# Patient Record
Sex: FEMALE | Race: WHITE | Employment: FULL TIME | URBAN - METROPOLITAN AREA
[De-identification: names, ages, dates, MRNs, and addresses within clinical notes are randomized per-mention and may not be internally consistent; named-entity substitution may affect disease eponyms.]

---

## 2019-01-08 ENCOUNTER — APPOINTMENT (OUTPATIENT)
Dept: GENERAL RADIOLOGY | Age: 59
End: 2019-01-08
Attending: FAMILY MEDICINE

## 2019-01-08 ENCOUNTER — HOSPITAL ENCOUNTER (OUTPATIENT)
Age: 59
Discharge: HOME | End: 2019-01-08
Attending: FAMILY MEDICINE

## 2019-01-08 VITALS
TEMPERATURE: 98 F | BODY MASS INDEX: 22.5 KG/M2 | OXYGEN SATURATION: 100 % | RESPIRATION RATE: 18 BRPM | WEIGHT: 140 LBS | HEIGHT: 66 IN | DIASTOLIC BLOOD PRESSURE: 76 MMHG | HEART RATE: 84 BPM | SYSTOLIC BLOOD PRESSURE: 126 MMHG

## 2019-01-08 DIAGNOSIS — J06.9 VIRAL UPPER RESPIRATORY TRACT INFECTION: Primary | ICD-10-CM

## 2019-01-08 PROCEDURE — 71046 X-RAY EXAM CHEST 2 VIEWS: CPT | Performed by: FAMILY MEDICINE

## 2019-01-08 PROCEDURE — 99214 OFFICE O/P EST MOD 30 MIN: CPT

## 2019-01-08 PROCEDURE — 99213 OFFICE O/P EST LOW 20 MIN: CPT

## 2019-01-08 RX ORDER — ALBUTEROL SULFATE 90 UG/1
2 AEROSOL, METERED RESPIRATORY (INHALATION) EVERY 4 HOURS PRN
Qty: 1 INHALER | Refills: 0 | Status: SHIPPED | OUTPATIENT
Start: 2019-01-08 | End: 2019-02-07

## 2019-01-08 RX ORDER — PROMETHAZINE HYDROCHLORIDE AND CODEINE PHOSPHATE 6.25; 1 MG/5ML; MG/5ML
5 SYRUP ORAL EVERY 4 HOURS PRN
Qty: 40 ML | Refills: 0 | Status: SHIPPED | OUTPATIENT
Start: 2019-01-08 | End: 2019-01-28

## 2019-01-08 NOTE — ED INITIAL ASSESSMENT (HPI)
Patient states having chest congestion and productive cough x 5 days. Patient c/o back pain with cough. Patient denies fever, denies chest pain or SOB.

## 2019-01-08 NOTE — ED PROVIDER NOTES
Pt seen in [address]      Stated Complaint: Patient presents with:  Cough/URI      --------------   RN assessment:     Cough, congestion  --------------     CC:  Cough, congestion    HPI:  Makenzie Madison is a 66 yea Sexual activity: Yes        Partners: Male        Comment: 6/2/15: rarely active, menopause     Other Topics      Concerns:        Not on file    Social History Narrative      Not on file      Past Medical, Surgical, Family, Medication and allergy historie symmetric all extremities   Neurologic:   CNII-XII intact, normal strength, sensation and reflexes     throughout     ------------------------------------------------  ED Course:  Labs/Tests reviewed:   Labs Reviewed - No data to display    Xr Chest Pa + L

## 2019-01-21 ENCOUNTER — HOSPITAL ENCOUNTER (OUTPATIENT)
Age: 59
Discharge: HOME | End: 2019-01-21
Attending: FAMILY MEDICINE
Payer: COMMERCIAL

## 2019-01-21 VITALS
DIASTOLIC BLOOD PRESSURE: 79 MMHG | SYSTOLIC BLOOD PRESSURE: 131 MMHG | HEIGHT: 67 IN | TEMPERATURE: 98 F | WEIGHT: 140 LBS | RESPIRATION RATE: 20 BRPM | BODY MASS INDEX: 21.97 KG/M2 | HEART RATE: 98 BPM | OXYGEN SATURATION: 98 %

## 2019-01-21 DIAGNOSIS — J01.00 ACUTE NON-RECURRENT MAXILLARY SINUSITIS: Primary | ICD-10-CM

## 2019-01-21 PROCEDURE — 99214 OFFICE O/P EST MOD 30 MIN: CPT

## 2019-01-21 PROCEDURE — 99213 OFFICE O/P EST LOW 20 MIN: CPT

## 2019-01-21 RX ORDER — AMOXICILLIN AND CLAVULANATE POTASSIUM 875; 125 MG/1; MG/1
1 TABLET, FILM COATED ORAL 2 TIMES DAILY
Qty: 20 TABLET | Refills: 0 | Status: SHIPPED | OUTPATIENT
Start: 2019-01-21 | End: 2019-01-31

## 2019-01-21 NOTE — ED PROVIDER NOTES
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 Constitutional and vital signs reviewed. All other systems reviewed and negative except as noted above.     Physical Exam     ED Triage Vitals [01/21/19 1220]   BP (!) 152/91   Pulse 112   Resp 20   Temp 97.8 °F (36.6 °C) place, and time. Skin: No rash noted. She is not diaphoretic. No erythema. No pallor. Nursing note and vitals reviewed. ED Course   Labs Reviewed - No data to display        MDM   Previous notes and imaging reviewed.  Will defer another chest xra

## 2019-01-21 NOTE — ED INITIAL ASSESSMENT (HPI)
Pt presents to the IC with c/o a productive cough for the last 3 weeks. Pt was seen 1.5 weeks ago without improvement. Phlegm is green in color. +nasal congestion. No chest pain or sob. No wheezing. No fevers or chills. Normal appetite.  No ear /throat pain

## 2020-04-08 ENCOUNTER — HOSPITAL ENCOUNTER (OUTPATIENT)
Age: 60
Discharge: HOME | End: 2020-04-08
Payer: COMMERCIAL

## 2020-04-08 VITALS
HEIGHT: 67 IN | TEMPERATURE: 98 F | BODY MASS INDEX: 20.4 KG/M2 | RESPIRATION RATE: 16 BRPM | WEIGHT: 130 LBS | HEART RATE: 112 BPM | SYSTOLIC BLOOD PRESSURE: 132 MMHG | DIASTOLIC BLOOD PRESSURE: 86 MMHG | OXYGEN SATURATION: 98 %

## 2020-04-08 DIAGNOSIS — R30.0 DYSURIA: Primary | ICD-10-CM

## 2020-04-08 PROCEDURE — 99214 OFFICE O/P EST MOD 30 MIN: CPT

## 2020-04-08 PROCEDURE — 81002 URINALYSIS NONAUTO W/O SCOPE: CPT

## 2020-04-08 PROCEDURE — 87086 URINE CULTURE/COLONY COUNT: CPT | Performed by: NURSE PRACTITIONER

## 2020-04-08 RX ORDER — FLUCONAZOLE 150 MG/1
150 TABLET ORAL ONCE
Qty: 1 TABLET | Refills: 0 | Status: SHIPPED | OUTPATIENT
Start: 2020-04-08 | End: 2020-04-08

## 2020-04-08 RX ORDER — CEPHALEXIN 500 MG/1
500 CAPSULE ORAL 3 TIMES DAILY
Qty: 21 CAPSULE | Refills: 0 | Status: SHIPPED | OUTPATIENT
Start: 2020-04-08 | End: 2020-04-15

## 2020-04-08 RX ORDER — PHENAZOPYRIDINE HYDROCHLORIDE 200 MG/1
200 TABLET, FILM COATED ORAL 3 TIMES DAILY PRN
Qty: 6 TABLET | Refills: 0 | Status: SHIPPED | OUTPATIENT
Start: 2020-04-08 | End: 2020-04-15

## 2020-04-08 NOTE — ED INITIAL ASSESSMENT (HPI)
2 days of urinary frequency. Similar symptoms 2 weeks ago. Took AZO with relief. Denies dysuria or cloudy urine. Mild back pain. Lower abdominal pressure.

## 2020-04-08 NOTE — ED PROVIDER NOTES
Patient presents with:  Urinary Symptoms      HPI:     Lupe Washington is a 64year old female who presents with a chief complaint of dysuria, urgency, and frequency for the past couple days.   She states she has been taking cranberry pills with mild r Lifestyle      Physical activity:        Days per week: Not on file        Minutes per session: Not on file      Stress: Not on file    Relationships      Social connections:        Talks on phone: Not on file        Gets together: Not on file        Atten Sig: Take 1 tablet (200 mg total) by mouth 3 (three) times daily as needed for Pain.           Dispense:  6 tablet          Refill:  0      Labs performed this visit:  Recent Results (from the past 10 hour(s))   [facility] POCT URINALYSIS DIPSTICK    [facility]

## 2020-05-04 ENCOUNTER — HOSPITAL ENCOUNTER (OUTPATIENT)
Age: 60
Discharge: HOME | End: 2020-05-04
Payer: COMMERCIAL

## 2020-05-04 VITALS
BODY MASS INDEX: 20.4 KG/M2 | WEIGHT: 130 LBS | DIASTOLIC BLOOD PRESSURE: 83 MMHG | RESPIRATION RATE: 18 BRPM | SYSTOLIC BLOOD PRESSURE: 139 MMHG | OXYGEN SATURATION: 97 % | TEMPERATURE: 98 F | HEIGHT: 67 IN | HEART RATE: 111 BPM

## 2020-05-04 DIAGNOSIS — R39.15 URINARY URGENCY: Primary | ICD-10-CM

## 2020-05-04 PROCEDURE — 99214 OFFICE O/P EST MOD 30 MIN: CPT

## 2020-05-04 PROCEDURE — 81002 URINALYSIS NONAUTO W/O SCOPE: CPT

## 2020-05-04 PROCEDURE — 80047 BASIC METABLC PNL IONIZED CA: CPT

## 2020-05-04 PROCEDURE — 36415 COLL VENOUS BLD VENIPUNCTURE: CPT

## 2020-05-04 PROCEDURE — 87086 URINE CULTURE/COLONY COUNT: CPT | Performed by: NURSE PRACTITIONER

## 2020-05-04 PROCEDURE — 85025 COMPLETE CBC W/AUTO DIFF WBC: CPT | Performed by: NURSE PRACTITIONER

## 2020-05-04 RX ORDER — NITROFURANTOIN 25; 75 MG/1; MG/1
100 CAPSULE ORAL 2 TIMES DAILY
Qty: 14 CAPSULE | Refills: 0 | Status: SHIPPED | OUTPATIENT
Start: 2020-05-04 | End: 2020-05-11

## 2020-05-04 NOTE — ED PROVIDER NOTES
Patient presents with:  Urinary Symptoms      HPI:     Erica Mckeon is a 64year old female who presents with a chief complaint of urinary urgency for 2 days. The patient states she had a urinary tract infection approximately 1 month ago.   She sta Alcohol/week: 0.0 standard drinks        Comment: rarely       Drug use: No      Sexual activity: Yes        Partners: Male        Comment: 6/2/15: rarely active, menopause     Lifestyle      Physical activity:        Days per week: Not on file Urine Dip      iStat (Chem 8)      Nitrofurantoin Monohyd Macro 100 MG Oral Cap          Sig: Take 1 capsule (100 mg total) by mouth 2 (two) times daily for 7 days.           Dispense:  14 capsule          Refill:  0      Labs performed this visit:  Recent i-STAT are within normal limits. The patient is aware of her testing results. Her abdominal exam is negative. She appears well. She is drinking plenty of fluids.   She states she is concerned because this is her second urinary tract infection and 1 christina

## 2020-05-06 ENCOUNTER — PATIENT MESSAGE (OUTPATIENT)
Dept: OBGYN CLINIC | Facility: CLINIC | Age: 60
End: 2020-05-06

## 2020-05-06 NOTE — TELEPHONE ENCOUNTER
From: Anamika Castellon  To: Senthil France MD  Sent: 5/6/2020 3:49 PM CDT  Subject: Referral Request    When should I see a urologist and can you make a referral .

## 2020-05-14 PROBLEM — R31.29 MICROHEMATURIA: Status: ACTIVE | Noted: 2020-05-14

## 2021-02-03 ENCOUNTER — HOSPITAL ENCOUNTER (OUTPATIENT)
Age: 61
Discharge: HOME | End: 2021-02-03
Attending: EMERGENCY MEDICINE
Payer: COMMERCIAL

## 2021-02-03 VITALS
HEART RATE: 115 BPM | TEMPERATURE: 99 F | RESPIRATION RATE: 18 BRPM | OXYGEN SATURATION: 98 % | SYSTOLIC BLOOD PRESSURE: 127 MMHG | DIASTOLIC BLOOD PRESSURE: 78 MMHG

## 2021-02-03 DIAGNOSIS — R21 SKIN RASH: Primary | ICD-10-CM

## 2021-02-03 LAB — GLUCOSE BLDC GLUCOMTR-MCNC: 113 MG/DL (ref 70–99)

## 2021-02-03 PROCEDURE — 82962 GLUCOSE BLOOD TEST: CPT

## 2021-02-03 PROCEDURE — 99212 OFFICE O/P EST SF 10 MIN: CPT

## 2021-02-03 RX ORDER — CLOTRIMAZOLE 1 %
1 CREAM (GRAM) TOPICAL 2 TIMES DAILY PRN
Qty: 12 G | Refills: 0 | Status: SHIPPED | OUTPATIENT
Start: 2021-02-03

## 2021-02-03 NOTE — ED INITIAL ASSESSMENT (HPI)
PATIENT ARRIVED AMBULATORY TO ROOM C/O A RASH TO BILATERAL UPPER THIGHS X1 MONTH. PATIENT STATES THE RASH IS MUCH BETTER BUT IT IS STILL THERE. NO ITCHINESS.

## 2021-02-03 NOTE — ED PROVIDER NOTES
Patient Seen in: [facility]      History   Patient presents with:  Rash Skin Problem    Stated Complaint: Rash    HPI/Subjective:   HPI    The patient is a 53-year-old female with past history of partial pneumonectomy who presents now with a tenderness  Eyes: Nonicteric sclera, no conjunctival injection  ENT: TMs are clear and flat bilaterally.   There is no posterior pharyngeal erythema  Chest: Clear to auscultation, no tenderness  Cardiovascular: Regular rate and rhythm without murmur  [place]

## 2022-05-06 ENCOUNTER — HOSPITAL ENCOUNTER (OUTPATIENT)
Age: 62
Discharge: HOME | End: 2022-05-06
Attending: EMERGENCY MEDICINE
Payer: COMMERCIAL

## 2022-05-06 VITALS
RESPIRATION RATE: 18 BRPM | SYSTOLIC BLOOD PRESSURE: 151 MMHG | TEMPERATURE: 99 F | OXYGEN SATURATION: 97 % | HEART RATE: 99 BPM | DIASTOLIC BLOOD PRESSURE: 90 MMHG

## 2022-05-06 DIAGNOSIS — J01.10 ACUTE NON-RECURRENT FRONTAL SINUSITIS: Primary | ICD-10-CM

## 2022-05-06 LAB — SARS-COV-2 RNA RESP QL NAA+PROBE: NOT DETECTED

## 2022-05-06 PROCEDURE — 99213 OFFICE O/P EST LOW 20 MIN: CPT

## 2022-05-06 RX ORDER — FLUTICASONE PROPIONATE 50 MCG
2 SPRAY, SUSPENSION (ML) NASAL DAILY
Qty: 16 G | Refills: 0 | Status: SHIPPED | OUTPATIENT
Start: 2022-05-06 | End: 2022-06-05

## 2022-05-06 RX ORDER — AMOXICILLIN 500 MG/1
500 TABLET, FILM COATED ORAL 3 TIMES DAILY
Qty: 21 TABLET | Refills: 0 | Status: SHIPPED | OUTPATIENT
Start: 2022-05-06 | End: 2022-05-13

## 2024-04-03 ENCOUNTER — APPOINTMENT (OUTPATIENT)
Dept: GENERAL RADIOLOGY | Age: 64
End: 2024-04-03
Attending: EMERGENCY MEDICINE
Payer: COMMERCIAL

## 2024-04-03 ENCOUNTER — HOSPITAL ENCOUNTER (OUTPATIENT)
Age: 64
Discharge: HOME | End: 2024-04-03
Payer: COMMERCIAL

## 2024-04-03 VITALS
DIASTOLIC BLOOD PRESSURE: 80 MMHG | RESPIRATION RATE: 16 BRPM | HEART RATE: 88 BPM | SYSTOLIC BLOOD PRESSURE: 133 MMHG | TEMPERATURE: 97 F | OXYGEN SATURATION: 100 %

## 2024-04-03 DIAGNOSIS — Z90.2 HISTORY OF LOBECTOMY OF LUNG: ICD-10-CM

## 2024-04-03 DIAGNOSIS — R09.81 NASAL CONGESTION: ICD-10-CM

## 2024-04-03 DIAGNOSIS — J06.9 UPPER RESPIRATORY TRACT INFECTION, UNSPECIFIED TYPE: Primary | ICD-10-CM

## 2024-04-03 DIAGNOSIS — R05.1 ACUTE COUGH: ICD-10-CM

## 2024-04-03 LAB — SARS-COV-2 RNA RESP QL NAA+PROBE: NOT DETECTED

## 2024-04-03 PROCEDURE — U0002 COVID-19 LAB TEST NON-CDC: HCPCS | Performed by: EMERGENCY MEDICINE

## 2024-04-03 PROCEDURE — 71046 X-RAY EXAM CHEST 2 VIEWS: CPT | Performed by: EMERGENCY MEDICINE

## 2024-04-03 PROCEDURE — 99213 OFFICE O/P EST LOW 20 MIN: CPT | Performed by: EMERGENCY MEDICINE

## 2024-04-03 PROCEDURE — 94640 AIRWAY INHALATION TREATMENT: CPT | Performed by: EMERGENCY MEDICINE

## 2024-04-03 RX ORDER — BENZONATATE 100 MG/1
100 CAPSULE ORAL 3 TIMES DAILY PRN
Qty: 30 CAPSULE | Refills: 0 | Status: SHIPPED | OUTPATIENT
Start: 2024-04-03

## 2024-04-03 RX ORDER — ALBUTEROL SULFATE 2.5 MG/3ML
2.5 SOLUTION RESPIRATORY (INHALATION) EVERY 4 HOURS PRN
Qty: 30 EACH | Refills: 0 | Status: SHIPPED | OUTPATIENT
Start: 2024-04-03 | End: 2024-05-03

## 2024-04-03 RX ORDER — ALBUTEROL SULFATE 2.5 MG/3ML
2.5 SOLUTION RESPIRATORY (INHALATION) EVERY 4 HOURS PRN
Qty: 30 EACH | Refills: 0 | Status: SHIPPED | OUTPATIENT
Start: 2024-04-03 | End: 2024-04-03

## 2024-04-03 RX ORDER — IPRATROPIUM BROMIDE AND ALBUTEROL SULFATE 2.5; .5 MG/3ML; MG/3ML
3 SOLUTION RESPIRATORY (INHALATION) ONCE
Status: COMPLETED | OUTPATIENT
Start: 2024-04-03 | End: 2024-04-03

## 2024-04-03 RX ORDER — BENZONATATE 100 MG/1
100 CAPSULE ORAL 3 TIMES DAILY PRN
Qty: 30 CAPSULE | Refills: 0 | Status: SHIPPED | OUTPATIENT
Start: 2024-04-03 | End: 2024-04-03

## 2024-04-03 NOTE — ED PROVIDER NOTES
Patient Seen in: [facility]      History     Chief Complaint   Patient presents with    Cough     Stated Complaint: cough    Subjective:   HPI  Yenny Bee is a 63 year old female here for congestion that is getting better, bilateral ear pressure, and cough for the last 13 days.  Feels congestion to the chest, and feels like she is coughing up a lot of phlegm.  Denies hemoptysis, or pink tinge sputum.  Medical history includes but not limited to former tobacco user, left partial pneumonectomy, and acid reflux.  No shortness of breath, chest pain, recent travel, coagulation defect, or other complaints at this time.  Otherwise well-appearing, speaking in clear full sentences, and hemodynamically appropriate for presenting complaint.     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              Past Surgical History:   Procedure Laterality Date    OTHER SURGICAL HISTORY  3/2009    Left Thoracotomy                Social History     Socioeconomic History    Marital status:    Tobacco Use    Smoking status: Former     Packs/day: 1.00     Years: 20.00     Additional pack years: 0.00     Total pack years: 20.00     Types: Cigarettes     Quit date: 2005     Years since quittin.1    Smokeless tobacco: Never   Vaping Use    Vaping Use: Never used   Substance and Sexual Activity    Alcohol use: Never     Alcohol/week: 0.0 standard drinks of alcohol    Drug use: No    Sexual activity: Yes     Partners: Male     Comment: 6/2/15: rarely active, menopause               Review of Systems    Positive for stated complaint: cough  Other systems are as noted in HPI.  Constitutional and vital signs reviewed.      All other systems reviewed and negative except as noted above.    Physical Exam     ED Triage Vitals [24 0935]   /80    Pulse 87   Resp 16   Temp 97 °F (36.1 °C)   Temp src Temporal   SpO2 100 %   O2 Device None (Room air)       Current:/80   Pulse 88   Temp 97 °F (36.1 °C) (Temporal)   Resp 16   LMP 12/17/2012   SpO2 100%         Physical Exam  Vitals and nursing note reviewed.   Constitutional:       General: She is not in acute distress.     Appearance: Normal appearance. She is not ill-appearing or toxic-appearing.   HENT:      Head: Normocephalic.      Right Ear: Ear canal and external ear normal.      Left Ear: Ear canal and external ear normal.      Ears:      Comments: Mild Bilateral clear effusions without bulge, erythema/injection.  No mastoid tenderness.     Nose: No congestion or rhinorrhea.      Mouth/Throat:      Mouth: Mucous membranes are moist.      Pharynx: No oropharyngeal exudate or posterior oropharyngeal erythema.   Eyes:      Extraocular Movements: Extraocular movements intact.      Conjunctiva/sclera: Conjunctivae normal.      Pupils: Pupils are equal, round, and reactive to light.   Cardiovascular:      Rate and Rhythm: Normal rate.      Pulses: Normal pulses.   Pulmonary:      Effort: Pulmonary effort is normal. No respiratory distress.      Comments: Excessive coughing on exam.  Rhonchi noted.  Musculoskeletal:      Cervical back: Normal range of motion. No erythema, rigidity or tenderness. No pain with movement, spinous process tenderness or muscular tenderness. Normal range of motion.   Lymphadenopathy:      Cervical: No cervical adenopathy.      Right cervical: No superficial, deep or posterior cervical adenopathy.     Left cervical: No superficial, deep or posterior cervical adenopathy.   Skin:     General: Skin is warm.      Capillary Refill: Capillary refill takes less than 2 seconds.      Findings: No lesion or rash.   Neurological:      General: No focal deficit present.      Mental Status: She is alert and oriented to person, place, and time.   Psychiatric:         Mood and Affect:  Mood normal.         Behavior: Behavior normal.         Thought Content: Thought content normal.         Judgment: Judgment normal.               ED Course     Labs Reviewed   RAPID SARS-COV-2 BY PCR - Normal           XR CHEST PA + LAT CHEST (CPT=71046)    Result Date: 4/3/2024  CONCLUSION:  1. No acute cardiopulmonary disease. 2. Postsurgical changes left upper lobe.  Chronic left apical pleural parenchymal scarring    Dictated by (CST): Florencio Rosales MD on 4/03/2024 at 10:28 AM     Finalized by (CST): Florencio Rosales MD on 4/03/2024 at 10:29 AM                    [facility]       Medical Decision Making  Differential diagnosis includes not limited to COVID vs flu, otitis media, otitis externa, simple ear effusion with earache, LRI vs URI, or somatic causes symptoms    Will treat for URI, and bronchospasm.  Mild left turbinate swelling without purulence, or signs of bacterial sinus infection.  Patient requesting watch and see antibiotics.  We discussed that antibiotics do not treat symptoms, and antibiotic resistance.  Chest x-ray is negative for pneumonia; antibiotics are not indicated this time.  Continue home treatment if it is helping symptoms.  Primary care follow-up when she returns from travel.  Supportive care include but not limited to otc treatment, cool mist humidifier, pain control, and hydration.   No stridor, No hot muffled speech, and no signs of compromise. Tolerating PO. Neuro wnl.   Reinforced ER precautions. all questions answered. No acute distress and appropriate for discharge home.     Problems Addressed:  Acute cough: acute illness or injury  History of lobectomy of lung: acute illness or injury  Nasal congestion: acute illness or injury  Upper respiratory tract infection, unspecified type: acute illness or injury    Amount and/or Complexity of Data Reviewed  External Data Reviewed: notes.  Labs: ordered. Decision-making details documented in ED Course.  Radiology: ordered and independent  interpretation performed. Decision-making details documented in ED Course.     Details: I agree with radiologist.  Surgical clips noted.  No pneumonia.  ECG/medicine tests: ordered and independent interpretation performed. Decision-making details documented in ED Course.     Details: duo nebulizer treatment verified with nurse.  No reaction from patient.  States she is feeling better.    Risk  OTC drugs.  Prescription drug management.        Disposition and Plan     Clinical Impression:  1. Upper respiratory tract infection, unspecified type    2. Acute cough    3. History of lobectomy of lung    4. Nasal congestion         Disposition:  Discharge  4/3/2024 10:51 am    Follow-up:  pcp    Schedule an appointment as soon as possible for a visit in 3 days            Medications Prescribed:  Discharge Medication List as of 4/3/2024 10:57 AM        START taking these medications    Details   albuterol (2.5 MG/3ML) 0.083% Inhalation Nebu Soln Take 3 mL (2.5 mg total) by nebulization every 4 (four) hours as needed for Wheezing., Print, Disp-30 each, R-0      benzonatate 100 MG Oral Cap Take 1 capsule (100 mg total) by mouth 3 (three) times daily as needed for cough., Print, Disp-30 capsule, [id number].  Collaborating physician Ira Willis.

## 2024-04-03 NOTE — ED INITIAL ASSESSMENT (HPI)
Pt c/o congestion, runny nose, B ear pressure, cough x13 days, chest congestion and rattling to L lung x9 days, upper back and mid back pain x2-3 days.  No fever.  Hx of L upper lung removed.

## 2024-04-03 NOTE — DISCHARGE INSTRUCTIONS
No pneumonia on chest x-ray as we discussed.  Antibiotics are not indicated at this time. They do not treat symptoms, or viral infections.   Tessalon Perles cough suppressant was printed and given to you to take into the pharmacy of your choice.  Albuterol nebulizer solution printed and given to you to the pharmacy of your choice.  Albuterol nebulizer solution is the same as an inhaler if you have it at home.  It can be used every 4-6 hours as needed for coughing spasms.  Continue home care if needed, and you can use over-the-counter guaifenesin which is a mucus thinner to help thin mucus to cough it up.  Go to ER for any new or worsening symptoms.  Try to avoid air for now with nasal congestion that can make symptoms worse.  If you are not better within the next few weeks please contact your primary care provider.